# Patient Record
Sex: MALE | HISPANIC OR LATINO | Employment: FULL TIME | URBAN - METROPOLITAN AREA
[De-identification: names, ages, dates, MRNs, and addresses within clinical notes are randomized per-mention and may not be internally consistent; named-entity substitution may affect disease eponyms.]

---

## 2018-05-18 ENCOUNTER — OFFICE VISIT (OUTPATIENT)
Dept: MEDICAL GROUP | Facility: MEDICAL CENTER | Age: 33
End: 2018-05-18
Payer: COMMERCIAL

## 2018-05-18 ENCOUNTER — HOSPITAL ENCOUNTER (OUTPATIENT)
Dept: LAB | Facility: MEDICAL CENTER | Age: 33
End: 2018-05-18
Attending: NURSE PRACTITIONER
Payer: COMMERCIAL

## 2018-05-18 VITALS
WEIGHT: 176.6 LBS | DIASTOLIC BLOOD PRESSURE: 76 MMHG | BODY MASS INDEX: 28.38 KG/M2 | SYSTOLIC BLOOD PRESSURE: 104 MMHG | HEIGHT: 66 IN | RESPIRATION RATE: 15 BRPM | TEMPERATURE: 97.6 F | HEART RATE: 60 BPM | OXYGEN SATURATION: 96 %

## 2018-05-18 DIAGNOSIS — K92.1 BLOOD IN THE STOOL: ICD-10-CM

## 2018-05-18 DIAGNOSIS — K64.9 HEMORRHOIDS, UNSPECIFIED HEMORRHOID TYPE: ICD-10-CM

## 2018-05-18 DIAGNOSIS — Z11.3 SCREEN FOR STD (SEXUALLY TRANSMITTED DISEASE): ICD-10-CM

## 2018-05-18 DIAGNOSIS — R10.33 PERIUMBILICAL ABDOMINAL PAIN: ICD-10-CM

## 2018-05-18 DIAGNOSIS — R42 DIZZINESS: ICD-10-CM

## 2018-05-18 DIAGNOSIS — R10.13 EPIGASTRIC PAIN: ICD-10-CM

## 2018-05-18 LAB
ALBUMIN SERPL BCP-MCNC: 4.6 G/DL (ref 3.2–4.9)
ALBUMIN/GLOB SERPL: 1.8 G/DL
ALP SERPL-CCNC: 40 U/L (ref 30–99)
ALT SERPL-CCNC: 14 U/L (ref 2–50)
ANION GAP SERPL CALC-SCNC: 7 MMOL/L (ref 0–11.9)
AST SERPL-CCNC: 19 U/L (ref 12–45)
BASOPHILS # BLD AUTO: 0.5 % (ref 0–1.8)
BASOPHILS # BLD: 0.03 K/UL (ref 0–0.12)
BILIRUB SERPL-MCNC: 0.5 MG/DL (ref 0.1–1.5)
BUN SERPL-MCNC: 15 MG/DL (ref 8–22)
CALCIUM SERPL-MCNC: 9.4 MG/DL (ref 8.5–10.5)
CHLORIDE SERPL-SCNC: 105 MMOL/L (ref 96–112)
CO2 SERPL-SCNC: 28 MMOL/L (ref 20–33)
CREAT SERPL-MCNC: 0.56 MG/DL (ref 0.5–1.4)
EOSINOPHIL # BLD AUTO: 0.1 K/UL (ref 0–0.51)
EOSINOPHIL NFR BLD: 1.8 % (ref 0–6.9)
ERYTHROCYTE [DISTWIDTH] IN BLOOD BY AUTOMATED COUNT: 34.7 FL (ref 35.9–50)
FERRITIN SERPL-MCNC: 84.1 NG/ML (ref 22–322)
GLOBULIN SER CALC-MCNC: 2.5 G/DL (ref 1.9–3.5)
GLUCOSE SERPL-MCNC: 98 MG/DL (ref 65–99)
HCT VFR BLD AUTO: 45.1 % (ref 42–52)
HCV AB SER QL: NEGATIVE
HGB BLD-MCNC: 15.8 G/DL (ref 14–18)
HIV 1+2 AB+HIV1 P24 AG SERPL QL IA: NON REACTIVE
IMM GRANULOCYTES # BLD AUTO: 0.01 K/UL (ref 0–0.11)
IMM GRANULOCYTES NFR BLD AUTO: 0.2 % (ref 0–0.9)
IRON SATN MFR SERPL: 35 % (ref 15–55)
IRON SERPL-MCNC: 117 UG/DL (ref 50–180)
LYMPHOCYTES # BLD AUTO: 2.36 K/UL (ref 1–4.8)
LYMPHOCYTES NFR BLD: 42.4 % (ref 22–41)
MCH RBC QN AUTO: 28.1 PG (ref 27–33)
MCHC RBC AUTO-ENTMCNC: 35 G/DL (ref 33.7–35.3)
MCV RBC AUTO: 80.2 FL (ref 81.4–97.8)
MONOCYTES # BLD AUTO: 0.4 K/UL (ref 0–0.85)
MONOCYTES NFR BLD AUTO: 7.2 % (ref 0–13.4)
NEUTROPHILS # BLD AUTO: 2.66 K/UL (ref 1.82–7.42)
NEUTROPHILS NFR BLD: 47.9 % (ref 44–72)
NRBC # BLD AUTO: 0 K/UL
NRBC BLD-RTO: 0 /100 WBC
PLATELET # BLD AUTO: 218 K/UL (ref 164–446)
PMV BLD AUTO: 8.8 FL (ref 9–12.9)
POTASSIUM SERPL-SCNC: 4.2 MMOL/L (ref 3.6–5.5)
PROT SERPL-MCNC: 7.1 G/DL (ref 6–8.2)
RBC # BLD AUTO: 5.62 M/UL (ref 4.7–6.1)
SODIUM SERPL-SCNC: 140 MMOL/L (ref 135–145)
TIBC SERPL-MCNC: 332 UG/DL (ref 250–450)
TSH SERPL DL<=0.005 MIU/L-ACNC: 2.12 UIU/ML (ref 0.38–5.33)
WBC # BLD AUTO: 5.6 K/UL (ref 4.8–10.8)

## 2018-05-18 PROCEDURE — 87591 N.GONORRHOEAE DNA AMP PROB: CPT

## 2018-05-18 PROCEDURE — 83550 IRON BINDING TEST: CPT

## 2018-05-18 PROCEDURE — 80053 COMPREHEN METABOLIC PANEL: CPT

## 2018-05-18 PROCEDURE — 83540 ASSAY OF IRON: CPT

## 2018-05-18 PROCEDURE — 85025 COMPLETE CBC W/AUTO DIFF WBC: CPT

## 2018-05-18 PROCEDURE — 99203 OFFICE O/P NEW LOW 30 MIN: CPT | Performed by: NURSE PRACTITIONER

## 2018-05-18 PROCEDURE — 84443 ASSAY THYROID STIM HORMONE: CPT

## 2018-05-18 PROCEDURE — 83013 H PYLORI (C-13) BREATH: CPT

## 2018-05-18 PROCEDURE — 82728 ASSAY OF FERRITIN: CPT

## 2018-05-18 PROCEDURE — 87389 HIV-1 AG W/HIV-1&-2 AB AG IA: CPT

## 2018-05-18 PROCEDURE — 86803 HEPATITIS C AB TEST: CPT

## 2018-05-18 PROCEDURE — 87491 CHLMYD TRACH DNA AMP PROBE: CPT

## 2018-05-18 PROCEDURE — 36415 COLL VENOUS BLD VENIPUNCTURE: CPT

## 2018-05-18 ASSESSMENT — PATIENT HEALTH QUESTIONNAIRE - PHQ9: CLINICAL INTERPRETATION OF PHQ2 SCORE: 0

## 2018-05-19 LAB
C TRACH DNA SPEC QL NAA+PROBE: NEGATIVE
N GONORRHOEA DNA SPEC QL NAA+PROBE: NEGATIVE
SPECIMEN SOURCE: NORMAL

## 2018-05-19 NOTE — PROGRESS NOTES
CC: establish care, hemorroids      Gonzalez Oswald is a 32 y.o. male here with his wife who is helping translate to establish care and to discuss the evaluation and management of:    Has not been to a primary care provider in several years.    1. Abdominal pain  States for the last month he has had some mid abdominal pain. Denies nausea, vomiting or diarrhea. Denies fever, chills, abdominal bloating. Has not tried anything for the pain. Nothing makes it better or worse.     2. Hemorrhoids, unspecified hemorrhoid type  3. Blood in the stool  States he sometimes gets blood in his stool and on the toilet paper. Denies excessively bleeding or constipation. Has not tried anything for this.     4. Dizziness  Has had some dizziness in the last month. Denies any syncopal episodes, does not think it is related to dehydration. States happens sometimes and not at any particular time of the day. Vague descriptions.    5. Screen for STD (sexually transmitted disease)  Wants to be screened for STI's      ROS:  Denies any Headache, Blurred Vision, Confusion Chest pain,  Shortness of breath,  Abdominal pain, Changes of bowel or bladder, Lower ext edema, Fevers, Nights sweats, Weight Changes, Focal weakness or numbness.  All other systems are negative.blood in stool, hemorrhoids, some dizziness    No current outpatient prescriptions on file.    No Known Allergies    Past Medical History:   Diagnosis Date   • Active medical problems: none      History reviewed. No pertinent surgical history.  Family History   Problem Relation Age of Onset   • Diabetes Mother      Social History     Social History   • Marital status:      Spouse name: N/A   • Number of children: N/A   • Years of education: N/A     Occupational History   • Not on file.     Social History Main Topics   • Smoking status: Never Smoker   • Smokeless tobacco: Never Used   • Alcohol use No   • Drug use: No   • Sexual activity: Yes     Partners: Female     Other  "Topics Concern   • Not on file     Social History Narrative   • No narrative on file       Objective:     Vitals: /76   Pulse 60   Temp 36.4 °C (97.6 °F)   Resp 15   Ht 1.676 m (5' 6\")   Wt 80.1 kg (176 lb 9.6 oz)   SpO2 96%   BMI 28.50 kg/m²      General: Alert, pleasant, NAD  HEENT:  Normocephalic. Neck supple.  No thyromegaly or masses palpated. No cervical or supraclavicular lymphadenopathy.  Heart:  Regular rate and rhythm.  S1 and S2 normal.  No murmurs appreciated.  Respiratory:  Normal respiratory effort.  Clear to auscultation bilaterally.    Abdomen:  Bowel sounds present.  Non-distended, soft, non-tender, no guarding/rebound. No hepatosplenomegaly.  No hernias.  Skin:  Warm, dry, no rashes  Musculoskeletal:  Gait is normal.  Moves all extremities well.  Extremities: No leg edema.  Neurological: No tremors, sensation grossly intact  Psych:  Affect/mood is normal, judgement is good, memory is intact, grooming is appropriate.      Assessment and Plan.   32 y.o. male to establish care and discuss the following    1. Periumbilical abdominal pain  In no acute distress. No fever, nausea or vomiting.  No rebound tenderness, soft, +bowel sounds, no pain at this time during palpation. Discussed keeping a food diary if he is having specific pain after eating. Emergent precautions discussed.  - CBC WITH DIFFERENTIAL; Future  - COMP METABOLIC PANEL; Future  - H. PYLORI, UREA BREATH TEST, ADULT; Future    2. Hemorrhoids, unspecified hemorrhoid type  Has been for the last month. Encouraged daily fiber, adequate hydration, avoid straining and prolonged sitting on the toilet, use witch hazel pads. If persists will refer to GI.  - CBC WITH DIFFERENTIAL; Future    3. Blood in the stool  See #1  - CBC WITH DIFFERENTIAL; Future  - COMP METABOLIC PANEL; Future    4. Dizziness  Intermittent. No syncopal episodes. Encouraged hydration.   - FERRITIN; Future  - IRON/TOTAL IRON BIND; Future  - TSH WITH REFLEX TO FT4; " Future    5. Screen for STD (sexually transmitted disease)    - HIV AG/AB COMBO ASSAY SCREENING; Future  - HEP C VIRUS ANTIBODY; Future  - CHLAMYDIA/GC PCR URINE OR SWAB; Future      Health Maintenance:     Return if symptoms worsen or fail to improve.          Ericka GRIFFIN.

## 2018-05-20 LAB — UREA BREATH TEST QL: POSITIVE

## 2018-05-21 DIAGNOSIS — A04.8 POSITIVE H. PYLORI TEST: ICD-10-CM

## 2018-05-21 RX ORDER — AMOXICILLIN 500 MG/1
1000 CAPSULE ORAL 2 TIMES DAILY
Qty: 56 CAP | Refills: 0 | Status: SHIPPED
Start: 2018-05-21 | End: 2020-12-31

## 2018-05-21 RX ORDER — CLARITHROMYCIN 500 MG/1
500 TABLET, COATED ORAL 2 TIMES DAILY
Qty: 28 TAB | Refills: 0 | Status: SHIPPED
Start: 2018-05-21 | End: 2020-12-31

## 2018-05-21 RX ORDER — OMEPRAZOLE 20 MG/1
20 CAPSULE, DELAYED RELEASE ORAL 2 TIMES DAILY
Qty: 60 CAP | Refills: 0 | Status: SHIPPED
Start: 2018-05-21

## 2020-07-05 ENCOUNTER — HOSPITAL ENCOUNTER (EMERGENCY)
Facility: MEDICAL CENTER | Age: 35
End: 2020-07-05
Attending: EMERGENCY MEDICINE
Payer: COMMERCIAL

## 2020-07-05 ENCOUNTER — APPOINTMENT (OUTPATIENT)
Dept: RADIOLOGY | Facility: MEDICAL CENTER | Age: 35
End: 2020-07-05
Attending: EMERGENCY MEDICINE
Payer: COMMERCIAL

## 2020-07-05 VITALS
OXYGEN SATURATION: 96 % | HEIGHT: 66 IN | WEIGHT: 194 LBS | RESPIRATION RATE: 18 BRPM | DIASTOLIC BLOOD PRESSURE: 69 MMHG | TEMPERATURE: 97.8 F | SYSTOLIC BLOOD PRESSURE: 115 MMHG | BODY MASS INDEX: 31.18 KG/M2 | HEART RATE: 70 BPM
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
## 2020-07-05 DIAGNOSIS — M25.512 ACUTE PAIN OF LEFT SHOULDER: ICD-10-CM

## 2020-07-05 LAB — EKG IMPRESSION: NORMAL

## 2020-07-05 PROCEDURE — 71046 X-RAY EXAM CHEST 2 VIEWS: CPT

## 2020-07-05 PROCEDURE — 93005 ELECTROCARDIOGRAM TRACING: CPT | Performed by: EMERGENCY MEDICINE

## 2020-07-05 PROCEDURE — A9270 NON-COVERED ITEM OR SERVICE: HCPCS | Performed by: EMERGENCY MEDICINE

## 2020-07-05 PROCEDURE — 99284 EMERGENCY DEPT VISIT MOD MDM: CPT

## 2020-07-05 PROCEDURE — 700102 HCHG RX REV CODE 250 W/ 637 OVERRIDE(OP): Performed by: EMERGENCY MEDICINE

## 2020-07-05 PROCEDURE — 73030 X-RAY EXAM OF SHOULDER: CPT | Mod: LT

## 2020-07-05 RX ORDER — IBUPROFEN 600 MG/1
600 TABLET ORAL ONCE
Status: COMPLETED | OUTPATIENT
Start: 2020-07-05 | End: 2020-07-05

## 2020-07-05 RX ORDER — IBUPROFEN 600 MG/1
600 TABLET ORAL EVERY 8 HOURS PRN
Qty: 15 TAB | Refills: 0 | Status: SHIPPED | OUTPATIENT
Start: 2020-07-05 | End: 2020-07-10

## 2020-07-05 RX ADMIN — IBUPROFEN 600 MG: 600 TABLET ORAL at 17:25
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
